# Patient Record
(demographics unavailable — no encounter records)

---

## 2025-05-02 NOTE — ASSESSMENT
[FreeTextEntry1] : 83 F w/ COPD, emphysema, RLL mixed attenuation nodule (being followed by Dr. Hernandez), presents for pre-op risk stratification prior to colonoscopy on 5/15 for a polyp resection with Dr. Bradley Madden in [facility].  - Patient is medically optimized from pulmonary standpoint for proposed colonoscopy procedure. Patient is at moderate risk for post-operative pulmonary complications and should undergo procedure if it is deemed medically necessary by the proceduralist that the potential benefits outweigh its risks. 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 Hernandez for this and no urgent changes in plan needs to be done at this moment unless otherwise directed by Dr. Hernandez. - She has a f/u appt with Dr. Hernandez on 6/5/25 to discuss above findings in more detail.  I personally reviewed all patient's imaging with the patient in-person in detail, showing the images and pointing out all relevant normal and abnormal findings if any were present, and answered all of the patient's questions.  I personally reviewed patient's pulmonary function testing with the patient in-person in detail, showing the flow volume loops and any normal and abnormal findings if present, and answered all of the patient's questions.

## 2025-05-02 NOTE — HISTORY OF PRESENT ILLNESS
[Current] : current [Never] : never [TextBox_4] : 83 F w/ COPD, emphysema, RLL mixed attenuation nodule (being followed by Dr. Hernandez), presents for pre-op risk stratification prior to colonoscopy on 5/15 for a polyp resection with Dr. Bradley Madden in [facility].  She is feeling well today.  Spirometry shows no significant obstruction or restriction in her ventilation.  She would like to review CT chest findings today. Showed a stable RLL ground glass lesion with 4 mm solid component. Appears stable dating back to 2022.  She is an ongoing smoker and is currently not interested in quitting.